# Patient Record
Sex: FEMALE | Race: WHITE | NOT HISPANIC OR LATINO | Employment: UNEMPLOYED | URBAN - METROPOLITAN AREA
[De-identification: names, ages, dates, MRNs, and addresses within clinical notes are randomized per-mention and may not be internally consistent; named-entity substitution may affect disease eponyms.]

---

## 2017-11-21 ENCOUNTER — APPOINTMENT (OUTPATIENT)
Dept: CT IMAGING | Facility: HOSPITAL | Age: 47
End: 2017-11-21

## 2017-11-21 ENCOUNTER — HOSPITAL ENCOUNTER (EMERGENCY)
Facility: HOSPITAL | Age: 47
Discharge: HOME | End: 2017-11-21
Attending: EMERGENCY MEDICINE | Admitting: EMERGENCY MEDICINE

## 2017-11-21 VITALS
BODY MASS INDEX: 23.55 KG/M2 | TEMPERATURE: 98.1 F | RESPIRATION RATE: 16 BRPM | SYSTOLIC BLOOD PRESSURE: 104 MMHG | DIASTOLIC BLOOD PRESSURE: 59 MMHG | OXYGEN SATURATION: 99 % | HEIGHT: 62 IN | HEART RATE: 64 BPM | WEIGHT: 128 LBS

## 2017-11-21 DIAGNOSIS — R10.13 EPIGASTRIC PAIN: Primary | ICD-10-CM

## 2017-11-21 LAB
ALBUMIN SERPL-MCNC: 3.8 G/DL (ref 3.5–5.2)
ALBUMIN/GLOB SERPL: 1.4 G/DL
ALP SERPL-CCNC: 77 U/L (ref 39–117)
ALT SERPL W P-5'-P-CCNC: 14 U/L (ref 1–33)
ANION GAP SERPL CALCULATED.3IONS-SCNC: 10.8 MMOL/L
AST SERPL-CCNC: 16 U/L (ref 1–32)
BACTERIA UR QL AUTO: ABNORMAL /HPF
BASOPHILS # BLD AUTO: 0.04 10*3/MM3 (ref 0–0.2)
BASOPHILS NFR BLD AUTO: 0.4 % (ref 0–1.5)
BILIRUB SERPL-MCNC: 0.2 MG/DL (ref 0.1–1.2)
BILIRUB UR QL STRIP: NEGATIVE
BUN BLD-MCNC: 23 MG/DL (ref 6–20)
BUN/CREAT SERPL: 41.1 (ref 7–25)
CALCIUM SPEC-SCNC: 9.4 MG/DL (ref 8.6–10.5)
CHLORIDE SERPL-SCNC: 108 MMOL/L (ref 98–107)
CLARITY UR: CLEAR
CO2 SERPL-SCNC: 24.2 MMOL/L (ref 22–29)
COLOR UR: YELLOW
CREAT BLD-MCNC: 0.56 MG/DL (ref 0.57–1)
DEPRECATED RDW RBC AUTO: 43.8 FL (ref 37–54)
EOSINOPHIL # BLD AUTO: 0.08 10*3/MM3 (ref 0–0.7)
EOSINOPHIL NFR BLD AUTO: 0.7 % (ref 0.3–6.2)
ERYTHROCYTE [DISTWIDTH] IN BLOOD BY AUTOMATED COUNT: 12.7 % (ref 11.7–13)
GFR SERPL CREATININE-BSD FRML MDRD: 116 ML/MIN/1.73
GLOBULIN UR ELPH-MCNC: 2.8 GM/DL
GLUCOSE BLD-MCNC: 104 MG/DL (ref 65–99)
GLUCOSE UR STRIP-MCNC: NEGATIVE MG/DL
HCG SERPL QL: NEGATIVE
HCT VFR BLD AUTO: 39.9 % (ref 35.6–45.5)
HGB BLD-MCNC: 13 G/DL (ref 11.9–15.5)
HGB UR QL STRIP.AUTO: NEGATIVE
HYALINE CASTS UR QL AUTO: ABNORMAL /LPF
IMM GRANULOCYTES # BLD: 0.03 10*3/MM3 (ref 0–0.03)
IMM GRANULOCYTES NFR BLD: 0.3 % (ref 0–0.5)
KETONES UR QL STRIP: NEGATIVE
LEUKOCYTE ESTERASE UR QL STRIP.AUTO: ABNORMAL
LIPASE SERPL-CCNC: 31 U/L (ref 13–60)
LYMPHOCYTES # BLD AUTO: 2.63 10*3/MM3 (ref 0.9–4.8)
LYMPHOCYTES NFR BLD AUTO: 23.6 % (ref 19.6–45.3)
MCH RBC QN AUTO: 31 PG (ref 26.9–32)
MCHC RBC AUTO-ENTMCNC: 32.6 G/DL (ref 32.4–36.3)
MCV RBC AUTO: 95 FL (ref 80.5–98.2)
MONOCYTES # BLD AUTO: 0.86 10*3/MM3 (ref 0.2–1.2)
MONOCYTES NFR BLD AUTO: 7.7 % (ref 5–12)
NEUTROPHILS # BLD AUTO: 7.49 10*3/MM3 (ref 1.9–8.1)
NEUTROPHILS NFR BLD AUTO: 67.3 % (ref 42.7–76)
NITRITE UR QL STRIP: NEGATIVE
NRBC BLD MANUAL-RTO: 0 /100 WBC (ref 0–0)
PH UR STRIP.AUTO: 6 [PH] (ref 5–8)
PLATELET # BLD AUTO: 270 10*3/MM3 (ref 140–500)
PMV BLD AUTO: 10.7 FL (ref 6–12)
POTASSIUM BLD-SCNC: 4.2 MMOL/L (ref 3.5–5.2)
PROT SERPL-MCNC: 6.6 G/DL (ref 6–8.5)
PROT UR QL STRIP: NEGATIVE
RBC # BLD AUTO: 4.2 10*6/MM3 (ref 3.9–5.2)
RBC # UR: ABNORMAL /HPF
REF LAB TEST METHOD: ABNORMAL
SODIUM BLD-SCNC: 143 MMOL/L (ref 136–145)
SP GR UR STRIP: 1.02 (ref 1–1.03)
SQUAMOUS #/AREA URNS HPF: ABNORMAL /HPF
UROBILINOGEN UR QL STRIP: ABNORMAL
WBC NRBC COR # BLD: 11.13 10*3/MM3 (ref 4.5–10.7)
WBC UR QL AUTO: ABNORMAL /HPF

## 2017-11-21 PROCEDURE — 83690 ASSAY OF LIPASE: CPT | Performed by: EMERGENCY MEDICINE

## 2017-11-21 PROCEDURE — 99284 EMERGENCY DEPT VISIT MOD MDM: CPT

## 2017-11-21 PROCEDURE — 87086 URINE CULTURE/COLONY COUNT: CPT | Performed by: EMERGENCY MEDICINE

## 2017-11-21 PROCEDURE — 74177 CT ABD & PELVIS W/CONTRAST: CPT

## 2017-11-21 PROCEDURE — 85025 COMPLETE CBC W/AUTO DIFF WBC: CPT | Performed by: EMERGENCY MEDICINE

## 2017-11-21 PROCEDURE — 80053 COMPREHEN METABOLIC PANEL: CPT | Performed by: EMERGENCY MEDICINE

## 2017-11-21 PROCEDURE — 0 IOPAMIDOL PER 1 ML: Performed by: EMERGENCY MEDICINE

## 2017-11-21 PROCEDURE — 84703 CHORIONIC GONADOTROPIN ASSAY: CPT | Performed by: EMERGENCY MEDICINE

## 2017-11-21 PROCEDURE — 81001 URINALYSIS AUTO W/SCOPE: CPT | Performed by: EMERGENCY MEDICINE

## 2017-11-21 RX ORDER — SODIUM CHLORIDE 0.9 % (FLUSH) 0.9 %
10 SYRINGE (ML) INJECTION AS NEEDED
Status: DISCONTINUED | OUTPATIENT
Start: 2017-11-21 | End: 2017-11-21 | Stop reason: HOSPADM

## 2017-11-21 RX ORDER — DICYCLOMINE HCL 20 MG
20 TABLET ORAL EVERY 6 HOURS PRN
Qty: 30 TABLET | Refills: 0 | Status: SHIPPED | OUTPATIENT
Start: 2017-11-21 | End: 2022-03-31 | Stop reason: ALTCHOICE

## 2017-11-21 RX ORDER — ALUMINA, MAGNESIA, AND SIMETHICONE 2400; 2400; 240 MG/30ML; MG/30ML; MG/30ML
15 SUSPENSION ORAL ONCE
Status: COMPLETED | OUTPATIENT
Start: 2017-11-21 | End: 2017-11-21

## 2017-11-21 RX ORDER — ONDANSETRON 2 MG/ML
4 INJECTION INTRAMUSCULAR; INTRAVENOUS ONCE
Status: DISCONTINUED | OUTPATIENT
Start: 2017-11-21 | End: 2017-11-21 | Stop reason: HOSPADM

## 2017-11-21 RX ADMIN — IOPAMIDOL 85 ML: 755 INJECTION, SOLUTION INTRAVENOUS at 07:26

## 2017-11-21 RX ADMIN — ALUMINUM HYDROXIDE, MAGNESIUM HYDROXIDE, AND DIMETHICONE 15 ML: 400; 400; 40 SUSPENSION ORAL at 07:35

## 2017-11-21 RX ADMIN — LIDOCAINE HYDROCHLORIDE 15 ML: 20 SOLUTION ORAL; TOPICAL at 07:35

## 2017-11-21 NOTE — ED PROVIDER NOTES
I supervised care provided by the midlevel provider.    We have discussed this patient's history, physical exam, and treatment plan.   I have reviewed the note and personally saw and examined the patient and agree with the plan of care.      Pt presents to the ED c/o epigastric abdominal pain onset earlier today. Pt has also had nausea and vomiting, but denies dyspnea, chest pain, and diarrhea. On physical exam, there is mild epigastric abdominal tenderness. There is no rebound, guarding, or mass. Pt's CT Abd is negative acute. Pt will be prescribed with rx for antispasmodic and will be discharged..                      Documentation assistance provided by Samir Horta. Information recorded by the scribe was done at my direction and has been verified and validated by me.     Entered by Samir Horta, acting as scribe for Dr. Ponce MD.        Samir Horta  11/21/17 8280       Manpreet Serra MD  11/21/17 7614

## 2017-11-21 NOTE — ED PROVIDER NOTES
EMERGENCY DEPARTMENT ENCOUNTER    CHIEF COMPLAINT  Chief Complaint: Abdominal Pain  History given by: Patient  History limited by: Nothing  Room Number: 15/15  PMD: SCOTT Denson      HPI:  Pt is a 47 y.o. female who presents to the ED via EMS complaining of constant epigastric abdominal discomfort which woke the patient from sleep around 0515 AM. The patient explains that the pain begun to migrate to her central abdomen and she compares the sensation to pressure / tightness from gas. She denies any recent fever, chills, nausea, vomiting, diarrhea, chest pain, SOA or dysuria since onset. Patient states that the pain is exacerbated with palpation and alleviated with no known factors. She also notes that she ate a hot pepper with dinner last night. She has never experienced similar pain in the past and she has no other complaints at this time.     Duration:  1 hour  Onset: Sudden  Timing: Constant  Location: Epigastric  Radiation: None  Quality: Pressure  / tightness  Intensity/Severity: Moderate  Progression: No Change  Associated Symptoms: Abdominal pain  Aggravating Factors: Palpation  Alleviating Factors: Unknown  Previous Episodes: None  Treatment before arrival: None    PAST MEDICAL HISTORY  Active Ambulatory Problems     Diagnosis Date Noted   • No Active Ambulatory Problems     Resolved Ambulatory Problems     Diagnosis Date Noted   • No Resolved Ambulatory Problems     Past Medical History:   Diagnosis Date   • Acetabular labrum tear    • ACL tear 2000   • Jaw fracture 2004       PAST SURGICAL HISTORY  Past Surgical History:   Procedure Laterality Date   • KNEE ARTHROSCOPY Left    • TONSILLECTOMY         FAMILY HISTORY  Family History   Problem Relation Age of Onset   • Adopted: Yes       SOCIAL HISTORY  Social History     Social History   • Marital status: Single     Spouse name: N/A   • Number of children: N/A   • Years of education: N/A     Occupational History   • Not on file.     Social History Main  Topics   • Smoking status: Current Every Day Smoker     Packs/day: 0.30     Years: 25.00     Types: Cigarettes   • Smokeless tobacco: Never Used      Comment: Pt states trying to quit by using patch    • Alcohol use 0.6 oz/week     1 Cans of beer per week      Comment: social   • Drug use: Not on file   • Sexual activity: Not on file     Other Topics Concern   • Not on file     Social History Narrative   • No narrative on file       ALLERGIES  Review of patient's allergies indicates no known allergies.    REVIEW OF SYSTEMS  Review of Systems   Constitutional: Negative for chills.   HENT: Negative for congestion, rhinorrhea and sore throat.    Eyes: Negative for pain.   Respiratory: Negative for cough and shortness of breath.    Cardiovascular: Negative for chest pain, palpitations and leg swelling.   Gastrointestinal: Positive for abdominal pain (Epigastric). Negative for diarrhea, nausea and vomiting.   Genitourinary: Negative for difficulty urinating, dysuria, flank pain and frequency.   Musculoskeletal: Negative for myalgias, neck pain and neck stiffness.   Skin: Negative for rash.   Neurological: Negative for dizziness, speech difficulty, weakness, light-headedness, numbness and headaches.   Psychiatric/Behavioral: Negative.    All other systems reviewed and are negative.      PHYSICAL EXAM  ED Triage Vitals   Temp Heart Rate Resp BP SpO2   11/21/17 0605 11/21/17 0605 11/21/17 0605 11/21/17 0605 11/21/17 0605   98.1 °F (36.7 °C) 50 16 135/88 99 %      Temp src Heart Rate Source Patient Position BP Location FiO2 (%)   -- 11/21/17 0605 11/21/17 0605 -- --    Monitor Sitting         Physical Exam   Constitutional: She is oriented to person, place, and time and well-developed, well-nourished, and in no distress. No distress.   HENT:   Head: Normocephalic and atraumatic.   Cardiovascular: Normal rate and regular rhythm.    Pulmonary/Chest: Effort normal and breath sounds normal. No respiratory distress.   Abdominal:  There is tenderness (Moderate) in the epigastric area. There is no rebound, no guarding and no CVA tenderness.   Neurological: She is alert and oriented to person, place, and time. She has normal sensation and normal strength.   Skin: Skin is warm and dry.   Psychiatric: Affect normal.   Nursing note and vitals reviewed.      LAB RESULTS  Lab Results (last 24 hours)     Procedure Component Value Units Date/Time    CBC & Differential [[id number]] Collected:  11/21/17 0624    Specimen:  Blood Updated:  11/21/17 0632    Narrative:       The following orders were created for panel order CBC & Differential.  Procedure                               Abnormality         Status                     ---------                               -----------         ------                     CBC Auto Differential[[id number]]        Abnormal            Final result                 Please view results for these tests on the individual orders.    Comprehensive Metabolic Panel [[id number]]  (Abnormal) Collected:  11/21/17 0624    Specimen:  Blood Updated:  11/21/17 0655     Glucose 104 (H) mg/dL      BUN 23 (H) mg/dL      Creatinine 0.56 (L) mg/dL      Sodium 143 mmol/L      Potassium 4.2 mmol/L      Chloride 108 (H) mmol/L      CO2 24.2 mmol/L      Calcium 9.4 mg/dL      Total Protein 6.6 g/dL      Albumin 3.80 g/dL      ALT (SGPT) 14 U/L      AST (SGOT) 16 U/L      Alkaline Phosphatase 77 U/L      Total Bilirubin 0.2 mg/dL      eGFR Non African Amer 116 mL/min/1.73      Globulin 2.8 gm/dL      A/G Ratio 1.4 g/dL      BUN/Creatinine Ratio 41.1 (H)     Anion Gap 10.8 mmol/L     Lipase [[id number]]  (Normal) Collected:  11/21/17 0624    Specimen:  Blood Updated:  11/21/17 0655     Lipase 31 U/L     hCG, Serum, Qualitative [[id number]]  (Normal) Collected:  11/21/17 0624    Specimen:  Blood Updated:  11/21/17 0653     HCG Qualitative Negative    CBC Auto Differential [[id number]]  (Abnormal) Collected:  11/21/17 0624    Specimen:  Blood Updated:   11/21/17 0632     WBC 11.13 (H) 10*3/mm3      RBC 4.20 10*6/mm3      Hemoglobin 13.0 g/dL      Hematocrit 39.9 %      MCV 95.0 fL      MCH 31.0 pg      MCHC 32.6 g/dL      RDW 12.7 %      RDW-SD 43.8 fl      MPV 10.7 fL      Platelets 270 10*3/mm3      Neutrophil % 67.3 %      Lymphocyte % 23.6 %      Monocyte % 7.7 %      Eosinophil % 0.7 %      Basophil % 0.4 %      Immature Grans % 0.3 %      Neutrophils, Absolute 7.49 10*3/mm3      Lymphocytes, Absolute 2.63 10*3/mm3      Monocytes, Absolute 0.86 10*3/mm3      Eosinophils, Absolute 0.08 10*3/mm3      Basophils, Absolute 0.04 10*3/mm3      Immature Grans, Absolute 0.03 10*3/mm3      nRBC 0.0 /100 WBC     Urinalysis With / Culture If Indicated - Urine, Clean Catch [[id number]]  (Abnormal) Collected:  11/21/17 0650    Specimen:  Urine from Urine, Clean Catch Updated:  11/21/17 0713     Color, UA Yellow     Appearance, UA Clear     pH, UA 6.0     Specific Gravity, UA 1.020     Glucose, UA Negative     Ketones, UA Negative     Bilirubin, UA Negative     Blood, UA Negative     Protein, UA Negative     Leuk Esterase, UA Small (1+) (A)     Nitrite, UA Negative     Urobilinogen, UA 0.2 E.U./dL    Urinalysis, Microscopic Only - Urine, Clean Catch [[id number]]  (Abnormal) Collected:  11/21/17 0650    Specimen:  Urine from Urine, Clean Catch Updated:  11/21/17 0713     RBC, UA 0-2 /HPF      WBC, UA 6-12 (A) /HPF      Bacteria, UA None Seen /HPF      Squamous Epithelial Cells, UA 0-2 /HPF      Hyaline Casts, UA 0-2 /LPF      Methodology Automated Microscopy    Urine Culture - Urine, Urine, Clean Catch [[id number]] Collected:  11/21/17 0650    Specimen:  Urine from Urine, Clean Catch Updated:  11/21/17 0713          I ordered the above labs and reviewed the results    RADIOLOGY  CT Abdomen Pelvis With Contrast    (Results Pending)      0747  Reviewed CT abd/pel - Negative acute. Independently viewed by me. Interpreted by radiologist. Discussed with Radiologist.    I ordered the  above noted radiological studies. Interpreted by radiologist. Discussed with radiologist. Reviewed by me in PACS.       PROCEDURES  Procedures      PROGRESS AND CONSULTS  ED Course     0617  Ordered Labs for further evaluation.     0634  Ordered CT abd/pel for further evaluation. Also ordered Maalox, Zofran for nausea and Dilaudid for pain control.     0753  Rechecked the patient and she is resting. Patient explains that she feels like she is about to pass gas. Discussed pertinent lab and imaging results, including CT abd/pel and labs are both unremarkable. Discussed the plan to have  evaluate her and refer the patient to a GI specialist for follow up. I explained that I would discharge the patient home with Bentyl. Patient agrees with plan and all questions were addressed.     0825  Discussed case with  and he agrees with the treatment plan.       MEDICAL DECISION MAKING  Results were reviewed/discussed with the patient and they were also made aware of online access. Pt also made aware that some labs, such as cultures, will not be resulted during ER visit and follow up with PMD is necessary.     MDM  Number of Diagnoses or Management Options  Epigastric pain:   Diagnosis management comments: No evidence of sepsis or surgical abdomen,        Amount and/or Complexity of Data Reviewed  Clinical lab tests: reviewed and ordered  Tests in the radiology section of CPT®: reviewed and ordered  Discussion of test results with the performing providers: yes    Patient Progress  Patient progress: stable         DIAGNOSIS  Final diagnoses:   Epigastric pain       DISPOSITION  DISCHARGE    Patient discharged in stable condition.    Reviewed implications of results, diagnosis, meds, responsibility to follow up, warning signs and symptoms of possible worsening, potential complications and reasons to return to ER, including worsening abdominal pain or development of a fever.     Patient/Family voiced  understanding of above instructions.    Discussed plan for discharge, as there is no emergent indication for admission.  Pt/family is agreeable and understands need for follow up and repeat testing.  Pt is aware that discharge does not mean that nothing is wrong but it indicates no emergency is present that requires admission and they must continue care with follow-up as given below or physician of their choice.     FOLLOW-UP  Lisa Santiago, APRN  [address] [facility]  [place]  [facility] [id number] [phone]      As needed    Jan Liriano MD  [address] [facility] [address]  [place] [address]  [phone]    In 3 days  if no improvement         Medication List      New Prescriptions          dicyclomine 20 MG tablet   Commonly known as:  BENTYL   Take 1 tablet by mouth Every 6 (Six) Hours As Needed (abdominal pain).               Latest Documented Vital Signs:  As of 8:38 AM  BP- 102/69 HR- 58 Temp- 98.1 °F (36.7 °C) O2 sat- 97%    --  Documentation assistance provided by pierre Abdi for Myles Chaidez PA-C. Information recorded by the scribe was done at my direction and has been verified and validated by me.        Jayjay Abdi  11/21/17 6373       WOLFGANG Lin  11/21/17 0035

## 2017-11-22 LAB
BACTERIA SPEC AEROBE CULT: ABNORMAL
BACTERIA SPEC AEROBE CULT: ABNORMAL

## 2017-11-24 ENCOUNTER — HOSPITAL ENCOUNTER (EMERGENCY)
Facility: HOSPITAL | Age: 47
Discharge: LEFT BEFORE DISCHARGE | End: 2017-11-24

## 2017-11-24 VITALS
SYSTOLIC BLOOD PRESSURE: 90 MMHG | TEMPERATURE: 97.8 F | DIASTOLIC BLOOD PRESSURE: 47 MMHG | HEIGHT: 62 IN | OXYGEN SATURATION: 99 % | RESPIRATION RATE: 20 BRPM | BODY MASS INDEX: 23.19 KG/M2 | HEART RATE: 61 BPM | WEIGHT: 126 LBS

## 2017-11-24 LAB
ALBUMIN SERPL-MCNC: 3.9 G/DL (ref 3.5–5.2)
ALBUMIN/GLOB SERPL: 1.6 G/DL
ALP SERPL-CCNC: 74 U/L (ref 39–117)
ALT SERPL W P-5'-P-CCNC: 14 U/L (ref 1–33)
ANION GAP SERPL CALCULATED.3IONS-SCNC: 11.6 MMOL/L
AST SERPL-CCNC: 16 U/L (ref 1–32)
BASOPHILS # BLD AUTO: 0.03 10*3/MM3 (ref 0–0.2)
BASOPHILS NFR BLD AUTO: 0.5 % (ref 0–1.5)
BILIRUB SERPL-MCNC: <0.2 MG/DL (ref 0.1–1.2)
BUN BLD-MCNC: 17 MG/DL (ref 6–20)
BUN/CREAT SERPL: 25 (ref 7–25)
CALCIUM SPEC-SCNC: 9.1 MG/DL (ref 8.6–10.5)
CHLORIDE SERPL-SCNC: 107 MMOL/L (ref 98–107)
CO2 SERPL-SCNC: 25.4 MMOL/L (ref 22–29)
CREAT BLD-MCNC: 0.68 MG/DL (ref 0.57–1)
DEPRECATED RDW RBC AUTO: 49.2 FL (ref 37–54)
EOSINOPHIL # BLD AUTO: 0.09 10*3/MM3 (ref 0–0.7)
EOSINOPHIL NFR BLD AUTO: 1.4 % (ref 0.3–6.2)
ERYTHROCYTE [DISTWIDTH] IN BLOOD BY AUTOMATED COUNT: 13.2 % (ref 11.7–13)
GFR SERPL CREATININE-BSD FRML MDRD: 92 ML/MIN/1.73
GLOBULIN UR ELPH-MCNC: 2.5 GM/DL
GLUCOSE BLD-MCNC: 103 MG/DL (ref 65–99)
HCG SERPL QL: NEGATIVE
HCT VFR BLD AUTO: 41.2 % (ref 35.6–45.5)
HGB BLD-MCNC: 12.4 G/DL (ref 11.9–15.5)
HOLD SPECIMEN: NORMAL
IMM GRANULOCYTES # BLD: 0 10*3/MM3 (ref 0–0.03)
IMM GRANULOCYTES NFR BLD: 0 % (ref 0–0.5)
LYMPHOCYTES # BLD AUTO: 2.84 10*3/MM3 (ref 0.9–4.8)
LYMPHOCYTES NFR BLD AUTO: 42.8 % (ref 19.6–45.3)
MCH RBC QN AUTO: 30.6 PG (ref 26.9–32)
MCHC RBC AUTO-ENTMCNC: 30.1 G/DL (ref 32.4–36.3)
MCV RBC AUTO: 101.7 FL (ref 80.5–98.2)
MONOCYTES # BLD AUTO: 0.55 10*3/MM3 (ref 0.2–1.2)
MONOCYTES NFR BLD AUTO: 8.3 % (ref 5–12)
NEUTROPHILS # BLD AUTO: 3.13 10*3/MM3 (ref 1.9–8.1)
NEUTROPHILS NFR BLD AUTO: 47 % (ref 42.7–76)
NRBC BLD MANUAL-RTO: 0 /100 WBC (ref 0–0)
PLAT MORPH BLD: NORMAL
PLATELET # BLD AUTO: 262 10*3/MM3 (ref 140–500)
PMV BLD AUTO: 11.1 FL (ref 6–12)
POTASSIUM BLD-SCNC: 4.5 MMOL/L (ref 3.5–5.2)
PROT SERPL-MCNC: 6.4 G/DL (ref 6–8.5)
RBC # BLD AUTO: 4.05 10*6/MM3 (ref 3.9–5.2)
RBC MORPH BLD: NORMAL
SODIUM BLD-SCNC: 144 MMOL/L (ref 136–145)
TROPONIN T SERPL-MCNC: <0.01 NG/ML (ref 0–0.03)
WBC MORPH BLD: NORMAL
WBC NRBC COR # BLD: 6.64 10*3/MM3 (ref 4.5–10.7)
WHOLE BLOOD HOLD SPECIMEN: NORMAL
WHOLE BLOOD HOLD SPECIMEN: NORMAL

## 2017-11-24 PROCEDURE — 93005 ELECTROCARDIOGRAM TRACING: CPT

## 2017-11-24 PROCEDURE — 93010 ELECTROCARDIOGRAM REPORT: CPT | Performed by: INTERNAL MEDICINE

## 2017-11-24 PROCEDURE — 84703 CHORIONIC GONADOTROPIN ASSAY: CPT

## 2017-11-24 PROCEDURE — 85025 COMPLETE CBC W/AUTO DIFF WBC: CPT

## 2017-11-24 PROCEDURE — 84484 ASSAY OF TROPONIN QUANT: CPT

## 2017-11-24 PROCEDURE — 36415 COLL VENOUS BLD VENIPUNCTURE: CPT

## 2017-11-24 PROCEDURE — 99211 OFF/OP EST MAY X REQ PHY/QHP: CPT

## 2017-11-24 PROCEDURE — 80053 COMPREHEN METABOLIC PANEL: CPT

## 2017-11-24 PROCEDURE — 85007 BL SMEAR W/DIFF WBC COUNT: CPT

## 2017-11-24 RX ORDER — SODIUM CHLORIDE 0.9 % (FLUSH) 0.9 %
10 SYRINGE (ML) INJECTION AS NEEDED
Status: DISCONTINUED | OUTPATIENT
Start: 2017-11-24 | End: 2017-11-24 | Stop reason: HOSPADM

## 2017-11-24 RX ORDER — ASPIRIN 325 MG
325 TABLET ORAL ONCE
Status: DISCONTINUED | OUTPATIENT
Start: 2017-11-24 | End: 2017-11-24 | Stop reason: HOSPADM

## 2017-12-19 ENCOUNTER — OFFICE VISIT (OUTPATIENT)
Dept: RETAIL CLINIC | Facility: CLINIC | Age: 47
End: 2017-12-19

## 2017-12-19 VITALS
TEMPERATURE: 98.2 F | DIASTOLIC BLOOD PRESSURE: 58 MMHG | OXYGEN SATURATION: 98 % | SYSTOLIC BLOOD PRESSURE: 90 MMHG | HEART RATE: 76 BPM | RESPIRATION RATE: 18 BRPM

## 2017-12-19 DIAGNOSIS — J40 BRONCHITIS: Primary | ICD-10-CM

## 2017-12-19 LAB
EXPIRATION DATE: NORMAL
FLUAV AG NPH QL: NEGATIVE
FLUBV AG NPH QL: NEGATIVE
INTERNAL CONTROL: NORMAL
Lab: NORMAL

## 2017-12-19 PROCEDURE — 87804 INFLUENZA ASSAY W/OPTIC: CPT | Performed by: NURSE PRACTITIONER

## 2017-12-19 PROCEDURE — 99213 OFFICE O/P EST LOW 20 MIN: CPT | Performed by: NURSE PRACTITIONER

## 2017-12-19 RX ORDER — ALBUTEROL SULFATE 90 UG/1
2 AEROSOL, METERED RESPIRATORY (INHALATION) EVERY 4 HOURS PRN
Qty: 1 INHALER | Refills: 0 | Status: SHIPPED | OUTPATIENT
Start: 2017-12-19 | End: 2018-01-02

## 2017-12-19 RX ORDER — GUAIFENESIN 600 MG/1
1200 TABLET, EXTENDED RELEASE ORAL EVERY 12 HOURS SCHEDULED
Qty: 28 TABLET | Refills: 0 | Status: SHIPPED | OUTPATIENT
Start: 2017-12-19 | End: 2017-12-26

## 2017-12-19 RX ORDER — PREDNISONE 20 MG/1
20 TABLET ORAL 2 TIMES DAILY
Qty: 14 TABLET | Refills: 0 | Status: SHIPPED | OUTPATIENT
Start: 2017-12-19 | End: 2017-12-26

## 2017-12-19 NOTE — PROGRESS NOTES
Chiara Arora is a 47 y.o. female.     Cough   This is a new problem. Episode onset: 2 days ago. The problem has been gradually worsening. The problem occurs every few minutes. Associated symptoms include ear congestion, ear pain (bilat pressure), eye redness, headaches, myalgias, rhinorrhea, a sore throat and wheezing. Pertinent negatives include no chest pain, chills, fever, heartburn, hemoptysis, nasal congestion, postnasal drip, rash, shortness of breath or sweats. The symptoms are aggravated by lying down and exercise. Risk factors for lung disease include smoking/tobacco exposure. She has tried nothing for the symptoms. Her past medical history is significant for bronchitis and pneumonia. There is no history of asthma, COPD or environmental allergies.       The following portions of the patient's history were reviewed and updated as appropriate: allergies, current medications, past family history, past medical history, past social history, past surgical history and problem list.    Review of Systems   Constitutional: Positive for appetite change and fatigue (extreme). Negative for chills, diaphoresis and fever.   HENT: Positive for ear pain (bilat pressure), rhinorrhea, sneezing, sore throat and voice change. Negative for congestion, ear discharge, facial swelling, hearing loss, mouth sores, nosebleeds, postnasal drip, sinus pain, sinus pressure and trouble swallowing.    Eyes: Positive for discharge (watery ) and redness. Negative for pain, itching and visual disturbance.   Respiratory: Positive for cough, chest tightness and wheezing. Negative for hemoptysis, shortness of breath and stridor.    Cardiovascular: Negative for chest pain and palpitations.   Gastrointestinal: Negative for abdominal pain, constipation, diarrhea, heartburn, nausea and vomiting.   Genitourinary: Negative for decreased urine volume.   Musculoskeletal: Positive for myalgias. Negative for neck pain and neck stiffness.    Skin: Negative for rash.   Allergic/Immunologic: Negative for environmental allergies and immunocompromised state.   Neurological: Positive for headaches. Negative for dizziness, syncope and weakness.       Objective   Physical Exam   Constitutional: She is oriented to person, place, and time. She appears well-developed and well-nourished. She is cooperative.  Non-toxic appearance. She appears ill. No distress.   HENT:   Right Ear: Hearing, tympanic membrane, external ear and ear canal normal.   Left Ear: Hearing, tympanic membrane, external ear and ear canal normal.   Nose: Mucosal edema and rhinorrhea present. No sinus tenderness. Right sinus exhibits no maxillary sinus tenderness and no frontal sinus tenderness. Left sinus exhibits no maxillary sinus tenderness and no frontal sinus tenderness.   Mouth/Throat: Uvula is midline and mucous membranes are normal. No oral lesions. Posterior oropharyngeal erythema present. No oropharyngeal exudate or posterior oropharyngeal edema. Tonsils are 2+ on the right. Tonsils are 2+ on the left. No tonsillar exudate.   Eyes: EOM and lids are normal. Pupils are equal, round, and reactive to light. Right conjunctiva is injected. Right conjunctiva has no hemorrhage. Left conjunctiva is injected. Left conjunctiva has no hemorrhage.   Cardiovascular: Normal rate, regular rhythm, S1 normal and S2 normal.    Pulmonary/Chest: Effort normal. She has decreased breath sounds in the right middle field, the right lower field, the left middle field and the left lower field. She has wheezes in the right upper field. She has no rhonchi. She has no rales.   Abdominal: Bowel sounds are normal. There is no tenderness.   Lymphadenopathy:     She has cervical adenopathy.        Right cervical: Superficial cervical adenopathy present. No deep cervical and no posterior cervical adenopathy present.       Left cervical: Superficial cervical adenopathy present. No deep cervical and no posterior  cervical adenopathy present.   Neurological: She is alert and oriented to person, place, and time.   Skin: Skin is warm and dry. She is not diaphoretic. No pallor.   Vitals reviewed.      Assessment/Plan   Pepper was seen today for sinusitis and cough.    Diagnoses and all orders for this visit:    Bronchitis  -     guaiFENesin (MUCINEX) 600 MG 12 hr tablet; Take 2 tablets by mouth Every 12 (Twelve) Hours for 7 days.  -     predniSONE (DELTASONE) 20 MG tablet; Take 1 tablet by mouth 2 (Two) Times a Day for 7 days.  -     albuterol (PROVENTIL HFA;VENTOLIN HFA) 108 (90 Base) MCG/ACT inhaler; Inhale 2 puffs Every 4 (Four) Hours As Needed for Wheezing or Shortness of Air for up to 14 days.  -     POC Influenza A / B          -     Smoking cessation-not interested at this time        -     Increase H2O intake        -     Follow up with PCP or urgent treatment for persistent or worsening symptoms       Lab Results (last 24 hours)     Procedure Component Value Units Date/Time    POC Influenza A / B [[id number]] Collected:  12/19/17 1627    Specimen:  Swab Updated:  12/19/17 1628     Rapid Influenza A Ag negative     Rapid Influenza B Ag negative     Internal Control Passed     Lot Number [id number]     Expiration Date 6/2019

## 2017-12-19 NOTE — PATIENT INSTRUCTIONS
Acute Bronchitis  Bronchitis is inflammation of the airways that extend from the windpipe into the lungs (bronchi). The inflammation often causes mucus to develop. This leads to a cough, which is the most common symptom of bronchitis.   In acute bronchitis, the condition usually develops suddenly and goes away over time, usually in a couple weeks. Smoking, allergies, and asthma can make bronchitis worse. Repeated episodes of bronchitis may cause further lung problems.   CAUSES  Acute bronchitis is most often caused by the same virus that causes a cold. The virus can spread from person to person (contagious) through coughing, sneezing, and touching contaminated objects.  SIGNS AND SYMPTOMS   · Cough.    · Fever.    · Coughing up mucus.    · Body aches.    · Chest congestion.    · Chills.    · Shortness of breath.    · Sore throat.    DIAGNOSIS   Acute bronchitis is usually diagnosed through a physical exam. Your health care provider will also ask you questions about your medical history. Tests, such as chest X-rays, are sometimes done to rule out other conditions.   TREATMENT   Acute bronchitis usually goes away in a couple weeks. Oftentimes, no medical treatment is necessary. Medicines are sometimes given for relief of fever or cough. Antibiotic medicines are usually not needed but may be prescribed in certain situations. In some cases, an inhaler may be recommended to help reduce shortness of breath and control the cough. A cool mist vaporizer may also be used to help thin bronchial secretions and make it easier to clear the chest.   HOME CARE INSTRUCTIONS  · Get plenty of rest.    · Drink enough fluids to keep your urine clear or pale yellow (unless you have a medical condition that requires fluid restriction). Increasing fluids may help thin your respiratory secretions (sputum) and reduce chest congestion, and it will prevent dehydration.    · Take medicines only as directed by your health care provider.  · If  you were prescribed an antibiotic medicine, finish it all even if you start to feel better.  · Avoid smoking and secondhand smoke. Exposure to cigarette smoke or irritating chemicals will make bronchitis worse. If you are a smoker, consider using nicotine gum or skin patches to help control withdrawal symptoms. Quitting smoking will help your lungs heal faster.    · Reduce the chances of another bout of acute bronchitis by washing your hands frequently, avoiding people with cold symptoms, and trying not to touch your hands to your mouth, nose, or eyes.    · Keep all follow-up visits as directed by your health care provider.    SEEK MEDICAL CARE IF:  Your symptoms do not improve after 1 week of treatment.   SEEK IMMEDIATE MEDICAL CARE IF:  · You develop an increased fever or chills.    · You have chest pain.    · You have severe shortness of breath.  · You have bloody sputum.    · You develop dehydration.  · You faint or repeatedly feel like you are going to pass out.  · You develop repeated vomiting.  · You develop a severe headache.  MAKE SURE YOU:   · Understand these instructions.  · Will watch your condition.  · Will get help right away if you are not doing well or get worse.     This information is not intended to replace advice given to you by your health care provider. Make sure you discuss any questions you have with your health care provider.     Document Released: 01/25/2006 Document Revised: 01/08/2016 Document Reviewed: 06/10/2014  [employer] Interactive Patient Education ©2017 [employer]

## 2018-07-27 ENCOUNTER — TRANSCRIBE ORDERS (OUTPATIENT)
Dept: PHYSICAL THERAPY | Facility: CLINIC | Age: 48
End: 2018-07-27

## 2018-07-27 DIAGNOSIS — S46.912A STRAIN OF LEFT SHOULDER, INITIAL ENCOUNTER: Primary | ICD-10-CM

## 2019-01-09 ENCOUNTER — TELEPHONE (OUTPATIENT)
Dept: OBSTETRICS AND GYNECOLOGY | Age: 49
End: 2019-01-09